# Patient Record
Sex: FEMALE | Race: WHITE | NOT HISPANIC OR LATINO | Employment: UNEMPLOYED | URBAN - METROPOLITAN AREA
[De-identification: names, ages, dates, MRNs, and addresses within clinical notes are randomized per-mention and may not be internally consistent; named-entity substitution may affect disease eponyms.]

---

## 2017-03-27 ENCOUNTER — OFFICE VISIT (OUTPATIENT)
Dept: FAMILY MEDICINE CLINIC | Facility: CLINIC | Age: 37
End: 2017-03-27

## 2017-03-27 ENCOUNTER — APPOINTMENT (OUTPATIENT)
Dept: ULTRASOUND IMAGING | Facility: HOSPITAL | Age: 37
End: 2017-03-27

## 2017-03-27 ENCOUNTER — HOSPITAL ENCOUNTER (EMERGENCY)
Facility: HOSPITAL | Age: 37
Discharge: HOME | End: 2017-03-27
Attending: EMERGENCY MEDICINE | Admitting: EMERGENCY MEDICINE

## 2017-03-27 VITALS
WEIGHT: 116 LBS | SYSTOLIC BLOOD PRESSURE: 108 MMHG | BODY MASS INDEX: 18.21 KG/M2 | OXYGEN SATURATION: 99 % | HEART RATE: 64 BPM | DIASTOLIC BLOOD PRESSURE: 70 MMHG | TEMPERATURE: 98.1 F | HEIGHT: 67 IN

## 2017-03-27 VITALS
OXYGEN SATURATION: 98 % | BODY MASS INDEX: 18.21 KG/M2 | TEMPERATURE: 98.2 F | SYSTOLIC BLOOD PRESSURE: 131 MMHG | HEIGHT: 67 IN | RESPIRATION RATE: 18 BRPM | HEART RATE: 75 BPM | WEIGHT: 116 LBS | DIASTOLIC BLOOD PRESSURE: 75 MMHG

## 2017-03-27 DIAGNOSIS — N30.90 CYSTITIS: ICD-10-CM

## 2017-03-27 DIAGNOSIS — R10.11 ACUTE ABDOMINAL PAIN IN RIGHT UPPER QUADRANT: Primary | ICD-10-CM

## 2017-03-27 DIAGNOSIS — R09.1 PLEURISY: Primary | ICD-10-CM

## 2017-03-27 LAB
ALBUMIN SERPL-MCNC: 4.5 G/DL (ref 3.5–5.2)
ALBUMIN/GLOB SERPL: 1.9 G/DL
ALP SERPL-CCNC: 39 U/L (ref 39–117)
ALT SERPL W P-5'-P-CCNC: 12 U/L (ref 1–33)
ANION GAP SERPL CALCULATED.3IONS-SCNC: 12.5 MMOL/L
AST SERPL-CCNC: 16 U/L (ref 1–32)
BACTERIA UR QL AUTO: ABNORMAL /HPF
BASOPHILS # BLD AUTO: 0.02 10*3/MM3 (ref 0–0.2)
BASOPHILS NFR BLD AUTO: 0.3 % (ref 0–1.5)
BILIRUB SERPL-MCNC: 0.4 MG/DL (ref 0.1–1.2)
BILIRUB UR QL STRIP: NEGATIVE
BUN BLD-MCNC: 9 MG/DL (ref 6–20)
BUN/CREAT SERPL: 16.7 (ref 7–25)
CALCIUM SPEC-SCNC: 9.2 MG/DL (ref 8.6–10.5)
CHLORIDE SERPL-SCNC: 104 MMOL/L (ref 98–107)
CLARITY UR: ABNORMAL
CO2 SERPL-SCNC: 24.5 MMOL/L (ref 22–29)
COLOR UR: YELLOW
CREAT BLD-MCNC: 0.54 MG/DL (ref 0.57–1)
D DIMER PPP FEU-MCNC: <0.27 MCGFEU/ML (ref 0–0.49)
DEPRECATED RDW RBC AUTO: 41.8 FL (ref 37–54)
EOSINOPHIL # BLD AUTO: 0.02 10*3/MM3 (ref 0–0.7)
EOSINOPHIL NFR BLD AUTO: 0.3 % (ref 0.3–6.2)
ERYTHROCYTE [DISTWIDTH] IN BLOOD BY AUTOMATED COUNT: 12.6 % (ref 11.7–13)
GFR SERPL CREATININE-BSD FRML MDRD: 128 ML/MIN/1.73
GLOBULIN UR ELPH-MCNC: 2.4 GM/DL
GLUCOSE BLD-MCNC: 106 MG/DL (ref 65–99)
GLUCOSE UR STRIP-MCNC: NEGATIVE MG/DL
HCG SERPL QL: NEGATIVE
HCT VFR BLD AUTO: 38.9 % (ref 35.6–45.5)
HGB BLD-MCNC: 12.9 G/DL (ref 11.9–15.5)
HGB UR QL STRIP.AUTO: NEGATIVE
HYALINE CASTS UR QL AUTO: ABNORMAL /LPF
IMM GRANULOCYTES # BLD: 0.02 10*3/MM3 (ref 0–0.03)
IMM GRANULOCYTES NFR BLD: 0.3 % (ref 0–0.5)
KETONES UR QL STRIP: NEGATIVE
LEUKOCYTE ESTERASE UR QL STRIP.AUTO: ABNORMAL
LIPASE SERPL-CCNC: 38 U/L (ref 13–60)
LYMPHOCYTES # BLD AUTO: 1.4 10*3/MM3 (ref 0.9–4.8)
LYMPHOCYTES NFR BLD AUTO: 22.1 % (ref 19.6–45.3)
MCH RBC QN AUTO: 30.4 PG (ref 26.9–32)
MCHC RBC AUTO-ENTMCNC: 33.2 G/DL (ref 32.4–36.3)
MCV RBC AUTO: 91.7 FL (ref 80.5–98.2)
MONOCYTES # BLD AUTO: 0.31 10*3/MM3 (ref 0.2–1.2)
MONOCYTES NFR BLD AUTO: 4.9 % (ref 5–12)
NEUTROPHILS # BLD AUTO: 4.57 10*3/MM3 (ref 1.9–8.1)
NEUTROPHILS NFR BLD AUTO: 72.1 % (ref 42.7–76)
NITRITE UR QL STRIP: NEGATIVE
PH UR STRIP.AUTO: 6 [PH] (ref 5–8)
PLATELET # BLD AUTO: 182 10*3/MM3 (ref 140–500)
PMV BLD AUTO: 11.3 FL (ref 6–12)
POTASSIUM BLD-SCNC: 3.5 MMOL/L (ref 3.5–5.2)
PROT SERPL-MCNC: 6.9 G/DL (ref 6–8.5)
PROT UR QL STRIP: NEGATIVE
RBC # BLD AUTO: 4.24 10*6/MM3 (ref 3.9–5.2)
RBC # UR: ABNORMAL /HPF
REF LAB TEST METHOD: ABNORMAL
SODIUM BLD-SCNC: 141 MMOL/L (ref 136–145)
SP GR UR STRIP: 1.01 (ref 1–1.03)
SQUAMOUS #/AREA URNS HPF: ABNORMAL /HPF
UROBILINOGEN UR QL STRIP: ABNORMAL
WBC NRBC COR # BLD: 6.34 10*3/MM3 (ref 4.5–10.7)
WBC UR QL AUTO: ABNORMAL /HPF
WHOLE BLOOD HOLD SPECIMEN: NORMAL

## 2017-03-27 PROCEDURE — 85379 FIBRIN DEGRADATION QUANT: CPT | Performed by: EMERGENCY MEDICINE

## 2017-03-27 PROCEDURE — 36415 COLL VENOUS BLD VENIPUNCTURE: CPT | Performed by: EMERGENCY MEDICINE

## 2017-03-27 PROCEDURE — 83690 ASSAY OF LIPASE: CPT | Performed by: EMERGENCY MEDICINE

## 2017-03-27 PROCEDURE — 99283 EMERGENCY DEPT VISIT LOW MDM: CPT

## 2017-03-27 PROCEDURE — 87086 URINE CULTURE/COLONY COUNT: CPT | Performed by: EMERGENCY MEDICINE

## 2017-03-27 PROCEDURE — 81001 URINALYSIS AUTO W/SCOPE: CPT | Performed by: EMERGENCY MEDICINE

## 2017-03-27 PROCEDURE — 85025 COMPLETE CBC W/AUTO DIFF WBC: CPT | Performed by: EMERGENCY MEDICINE

## 2017-03-27 PROCEDURE — 84703 CHORIONIC GONADOTROPIN ASSAY: CPT | Performed by: EMERGENCY MEDICINE

## 2017-03-27 PROCEDURE — 76705 ECHO EXAM OF ABDOMEN: CPT

## 2017-03-27 PROCEDURE — 80053 COMPREHEN METABOLIC PANEL: CPT | Performed by: EMERGENCY MEDICINE

## 2017-03-27 PROCEDURE — 99214 OFFICE O/P EST MOD 30 MIN: CPT | Performed by: NURSE PRACTITIONER

## 2017-03-27 RX ORDER — SULFAMETHOXAZOLE AND TRIMETHOPRIM 800; 160 MG/1; MG/1
1 TABLET ORAL 2 TIMES DAILY
Qty: 6 TABLET | Refills: 0 | Status: SHIPPED | OUTPATIENT
Start: 2017-03-27

## 2017-03-27 RX ORDER — SODIUM CHLORIDE 0.9 % (FLUSH) 0.9 %
10 SYRINGE (ML) INJECTION AS NEEDED
Status: DISCONTINUED | OUTPATIENT
Start: 2017-03-27 | End: 2017-03-27 | Stop reason: HOSPADM

## 2017-03-27 RX ORDER — NAPROXEN 500 MG/1
500 TABLET ORAL 2 TIMES DAILY WITH MEALS
Qty: 15 TABLET | Refills: 0 | Status: SHIPPED | OUTPATIENT
Start: 2017-03-27

## 2017-03-27 NOTE — PROGRESS NOTES
Subjective   Jazz Tatum is a 36 y.o. female.     History of Present Illness Patient admits to right sided abdominal pain for one week. Pain is constant and sharp. Pain increases with bending. Pain is 5/8-10. No nausea.  Pain started 1 week ago and it has progressed. Pt has applied heat and pain is radiating to back and under right ribs.   Abdominal Pain: Patient complains of abdominal pain. The pain is described as pressure-like, sharp, shooting and stabbing, and is 8/10 in intensity. Pain is located in the RUQ with radiation to back. Onset was 1 week ago. Symptoms have been rapidly worsening since. Aggravating factors: sitting up, standing and movement.  Alleviating factors: none. Associated symptoms: anorexia. The patient denies fever, headache, hematuria and melena.  LMP 2 weeks ago - irregular,  has had a vasectomy. Pt has a hx of fertility problems and early menopausal symptoms.     The following portions of the patient's history were reviewed and updated as appropriate: allergies, current medications, past family history, past medical history, past social history, past surgical history and problem list.    Review of Systems   Constitutional: Negative for fever.   Gastrointestinal: Positive for nausea.       Objective   Physical Exam   Constitutional: She appears well-developed and well-nourished.   HENT:   Head: Normocephalic.   Eyes: Pupils are equal, round, and reactive to light.   Neck: Normal range of motion. Neck supple.   Cardiovascular: Normal rate and regular rhythm.    Pulmonary/Chest: Effort normal and breath sounds normal.   Abdominal: Soft. She exhibits no distension and no mass. There is no hepatosplenomegaly. There is tenderness in the right upper quadrant. There is rebound and guarding. No hernia.       Acute right upper quadrant pain with radiation to back. Skin clear.   Lymphadenopathy:     She has cervical adenopathy.   Neurological: She is alert.   Skin: Skin is warm and  dry.   Psychiatric: She has a normal mood and affect.   Nursing note and vitals reviewed.      Assessment/Plan   Jazz was seen today for abdominal pain.    Diagnoses and all orders for this visit:    Acute abdominal pain in right upper quadrant    Pt to ER via private car with .

## 2017-03-27 NOTE — ED PROVIDER NOTES
EMERGENCY DEPARTMENT ENCOUNTER    CHIEF COMPLAINT  Chief Complaint: Abdominal Pain  History given by: Patient  History limited by: Nothing  Room Number: 11/11  PMD: Lester Almaguer MD      HPI:  Pt is a 36 y.o. female presents complaining of gradually worsening RUQ abdominal pain, which began one week ago and she has progressively developed radiating pain to her back. The patient believed that she might have pulled at muscle although she does not remember injuring the area. She placed heating pads over the area since onset onset with no relief.  Patient states that the abdominal pain is exacerbated with palpation, deep breaths or movement and improved with positional rest. The patient had a recent cold approximately two weeks ago, although she is currently asymptomatic for URI. She denies nausea, vomiting, diarrhea, fever or chills since onset. No other complaints at this time.        Duration:  One week  Onset: Gradual  Timing: Constant  Location: RUQ  Radiation: Back   Quality: Stabbing  Intensity/Severity: Moderate  Progression: Worsening  Associated Symptoms: Abdominal pain, back pain  Aggravating Factors: Palpation, deep breaths movement  Alleviating Factors: Immobilziation  Previous Episodes: None  Treatment before arrival: Heating pad    PAST MEDICAL HISTORY  Active Ambulatory Problems     Diagnosis Date Noted   • No Active Ambulatory Problems     Resolved Ambulatory Problems     Diagnosis Date Noted   • No Resolved Ambulatory Problems     Past Medical History:   Diagnosis Date   • ADHD (attention deficit hyperactivity disorder)    • Asthma        PAST SURGICAL HISTORY  Past Surgical History:   Procedure Laterality Date   • LEG SURGERY Bilateral     oblation       FAMILY HISTORY  Family History   Problem Relation Age of Onset   • Asthma Mother        SOCIAL HISTORY  Social History     Social History   • Marital status:      Spouse name: N/A   • Number of children: N/A   • Years of education: N/A      Occupational History   • Not on file.     Social History Main Topics   • Smoking status: Never Smoker   • Smokeless tobacco: Never Used   • Alcohol use 4.2 oz/week     7 Glasses of wine per week   • Drug use: Not on file   • Sexual activity: Not on file     Other Topics Concern   • Not on file     Social History Narrative       ALLERGIES  Penicillins    REVIEW OF SYSTEMS  Review of Systems   Constitutional: Negative for chills.   HENT: Negative for congestion, rhinorrhea and sore throat.    Eyes: Negative for pain.   Respiratory: Negative for cough and shortness of breath.    Cardiovascular: Negative for chest pain, palpitations and leg swelling.   Gastrointestinal: Positive for abdominal pain (RUQ). Negative for diarrhea, nausea and vomiting.   Genitourinary: Negative for difficulty urinating, dysuria, flank pain and frequency.   Musculoskeletal: Positive for back pain. Negative for myalgias, neck pain and neck stiffness.   Skin: Negative for rash.   Neurological: Negative for dizziness, speech difficulty, weakness, light-headedness, numbness and headaches.   Psychiatric/Behavioral: Negative.    All other systems reviewed and are negative.      PHYSICAL EXAM  ED Triage Vitals   Temp Heart Rate Resp BP SpO2   03/27/17 1153 03/27/17 1153 03/27/17 1153 03/27/17 1153 03/27/17 1153   97.9 °F (36.6 °C) 80 16 131/92 99 %      Temp src Heart Rate Source Patient Position BP Location FiO2 (%)   03/27/17 1153 03/27/17 1153 -- -- --   Tympanic Monitor          Physical Exam   Constitutional: She is oriented to person, place, and time and well-developed, well-nourished, and in no distress. No distress.   HENT:   Head: Normocephalic and atraumatic.   Eyes: EOM are normal. Pupils are equal, round, and reactive to light.   Neck: Normal range of motion. Neck supple.   Cardiovascular: Normal rate, regular rhythm and normal heart sounds.    Pulmonary/Chest: Effort normal and breath sounds normal. No respiratory distress.   Right  chest wall tenderness.    Abdominal: Soft. There is tenderness in the right upper quadrant. There is guarding (Vol.). There is no rebound.   Musculoskeletal: Normal range of motion. She exhibits no edema.        Right lower leg: She exhibits no tenderness.        Left lower leg: She exhibits no tenderness.   Non-reproducible back pain.     Neurological: She is alert and oriented to person, place, and time. She has normal sensation and normal strength.   Normal neuro   Skin: Skin is warm and dry. No rash noted.   No edema lower extremities    Psychiatric: Mood and affect normal.   Nursing note and vitals reviewed.      LAB RESULTS  Lab Results (last 24 hours)     Procedure Component Value Units Date/Time    CBC & Differential [[id number]] Collected:  03/27/17 1201    Specimen:  Blood Updated:  03/27/17 1240    Narrative:       The following orders were created for panel order CBC & Differential.  Procedure                               Abnormality         Status                     ---------                               -----------         ------                     CBC Auto Differential[[id number]]         Abnormal            Final result                 Please view results for these tests on the individual orders.    Comprehensive Metabolic Panel [[id number]]  (Abnormal) Collected:  03/27/17 1201    Specimen:  Blood Updated:  03/27/17 1301     Glucose 106 (H) mg/dL      BUN 9 mg/dL      Creatinine 0.54 (L) mg/dL      Sodium 141 mmol/L      Potassium 3.5 mmol/L      Chloride 104 mmol/L      CO2 24.5 mmol/L      Calcium 9.2 mg/dL      Total Protein 6.9 g/dL      Albumin 4.50 g/dL      ALT (SGPT) 12 U/L      AST (SGOT) 16 U/L      Alkaline Phosphatase 39 U/L      Total Bilirubin 0.4 mg/dL      eGFR Non African Amer 128 mL/min/1.73      Globulin 2.4 gm/dL      A/G Ratio 1.9 g/dL      BUN/Creatinine Ratio 16.7     Anion Gap 12.5 mmol/L     Lipase [[id number]]  (Normal) Collected:  03/27/17 1201    Specimen:  Blood Updated:   03/27/17 1301     Lipase 38 U/L     hCG, Serum, Qualitative [[id number]]  (Normal) Collected:  03/27/17 1201    Specimen:  Blood Updated:  03/27/17 1304     HCG Qualitative Negative    CBC Auto Differential [[id number]]  (Abnormal) Collected:  03/27/17 1201    Specimen:  Blood Updated:  03/27/17 1240     WBC 6.34 10*3/mm3      RBC 4.24 10*6/mm3      Hemoglobin 12.9 g/dL      Hematocrit 38.9 %      MCV 91.7 fL      MCH 30.4 pg      MCHC 33.2 g/dL      RDW 12.6 %      RDW-SD 41.8 fl      MPV 11.3 fL      Platelets 182 10*3/mm3      Neutrophil % 72.1 %      Lymphocyte % 22.1 %      Monocyte % 4.9 (L) %      Eosinophil % 0.3 %      Basophil % 0.3 %      Immature Grans % 0.3 %      Neutrophils, Absolute 4.57 10*3/mm3      Lymphocytes, Absolute 1.40 10*3/mm3      Monocytes, Absolute 0.31 10*3/mm3      Eosinophils, Absolute 0.02 10*3/mm3      Basophils, Absolute 0.02 10*3/mm3      Immature Grans, Absolute 0.02 10*3/mm3     D-dimer, Quantitative [[id number]]  (Normal) Collected:  03/27/17 1201    Specimen:  Blood from Arm, Right Updated:  03/27/17 1747     D-Dimer, Quantitative <0.27 MCGFEU/mL     Narrative:       The [employer]ago D-Dimer test used in conjunction with a clinical pretest probability (PTP) assessment model, has been approved by the FDA to rule out the presence of venous thromboembolism (VTE) in outpatients suspected of deep venous thrombosis (DVT) or pulmonary embolism (PE).     Urinalysis With / Culture If Indicated [[id number]]  (Abnormal) Collected:  03/27/17 1646    Specimen:  Urine from Urine, Clean Catch Updated:  03/27/17 1711     Color, UA Yellow     Appearance, UA Cloudy (A)     pH, UA 6.0     Specific Gravity, UA 1.010     Glucose, UA Negative     Ketones, UA Negative     Bilirubin, UA Negative     Blood, UA Negative     Protein, UA Negative     Leuk Esterase, UA Trace (A)     Nitrite, UA Negative     Urobilinogen, UA 0.2 E.U./dL    Urinalysis, Microscopic Only [[id number]]  (Abnormal) Collected:  03/27/17 7407     Specimen:  Urine from Urine, Clean Catch Updated:  03/27/17 1748     RBC, UA 0-2 /HPF      WBC, UA 0-2 /HPF      Bacteria, UA 2+ (A) /HPF      Squamous Epithelial Cells, UA 13-20 (A) /HPF      Hyaline Casts, UA 3-6 /LPF      Methodology Automated Microscopy    Urine Culture [[id number]] Collected:  03/27/17 1646    Specimen:  Urine from Urine, Clean Catch Updated:  03/27/17 1720          I ordered the above labs and reviewed the results    RADIOLOGY  US Gallbladder   Final Result       No evidence for acute cholecystitis. With persistent clinical   indication, hepatobiliary scintigraphy and/or CT scan could be   considered for further evaluation.               This report was finalized on 3/27/2017 5:16 PM by Dr. Donell Dominguez MD.             17:40  Reviewed US Gallbladder - Normal. Independently viewed by me. Interpreted by radiologist.    I ordered the above noted radiological studies. Interpreted by radiologist. Reviewed by me in PACS.       PROCEDURES  Procedures      PROGRESS AND CONSULTS  ED Course     11:56  Ordered Labs for further evaluation     16:35  Ordered US Gallbladder for further evaluation     18:20  Rechecked patient and they are resting comfortably. Discussed pertinent lab and imaging results, including US gallbladder shows nothing acute. Her UA showed mild signs of infection. I also explained that her D-Dimer was negative. I explained that she most likely has pleurisy and discussed the plan to discharge the patient home on Naprosyn and Bactrim.  Patient agrees with plan and all questions were addressed.     Latest vital signs   BP- 118/70 HR- 78 Temp- 98.2 °F (36.8 °C) (Oral) O2 sat- 96%        MEDICAL DECISION MAKING  Results were reviewed/discussed with the patient and they were also made aware of online access. Pt also made aware that some labs, such as cultures, will not be resulted during ER visit and follow up with PMD is necessary.     MDM  Number of Diagnoses or Management Options      Amount and/or Complexity of Data Reviewed  Clinical lab tests: ordered and reviewed  Tests in the radiology section of CPT®: ordered and reviewed    Patient Progress  Patient progress: stable         DIAGNOSIS  Final diagnoses:   Pleurisy   Cystitis       DISPOSITION  DISCHARGE    Patient discharged in stable condition.    Reviewed implications of results, diagnosis, meds, responsibility to follow up, warning signs and symptoms of possible worsening, potential complications and reasons to return to ER, including worsening pleuritic symptoms.     Patient/Family voiced understanding of above instructions.    Discussed plan for discharge, as there is no emergent indication for admission.  Pt/family is agreeable and understands need for follow up and repeat testing.  Pt is aware that discharge does not mean that nothing is wrong but it indicates no emergency is present that requires admission and they must continue care with follow-up as given below or physician of their choice.     FOLLOW-UP  Lester Almaguer MD  [address] [id number] [phone]    In 3 days  If symptoms worsen         Medication List      New Prescriptions          naproxen 500 MG EC tablet   Commonly known as:  EC NAPROSYN   Take 1 tablet by mouth 2 (Two) Times a Day With Meals.       sulfamethoxazole-trimethoprim 800-160 MG per tablet   Commonly known as:  BACTRIM DS,SEPTRA DS   Take 1 tablet by mouth 2 (Two) Times a Day.             Latest Documented Vital Signs:  As of 6:36 PM  BP- 118/70 HR- 78 Temp- 98.2 °F (36.8 °C) (Oral) O2 sat- 96%    --  Documentation assistance provided by stacey Joseph for .  Information recorded by the scribe was done at my direction and has been verified and validated by me.            Johnny Joseph  03/27/17 1836       Immanuel Arreguin MD  03/27/17 2002

## 2017-03-28 ENCOUNTER — TELEPHONE (OUTPATIENT)
Dept: SOCIAL WORK | Facility: HOSPITAL | Age: 37
End: 2017-03-28

## 2017-03-28 NOTE — TELEPHONE ENCOUNTER
ED follow-up phone call. States she is taking prescribed meds and is feeling a little better, but til has soreness. No questions/concerns

## 2017-03-29 LAB — BACTERIA SPEC AEROBE CULT: NORMAL

## 2021-04-06 ENCOUNTER — BULK ORDERING (OUTPATIENT)
Dept: CASE MANAGEMENT | Facility: OTHER | Age: 41
End: 2021-04-06

## 2021-04-06 DIAGNOSIS — Z23 IMMUNIZATION DUE: ICD-10-CM

## 2021-06-24 ENCOUNTER — APPOINTMENT (OUTPATIENT)
Dept: WOMENS IMAGING | Facility: HOSPITAL | Age: 41
End: 2021-06-24

## 2021-06-24 PROCEDURE — 77063 BREAST TOMOSYNTHESIS BI: CPT | Performed by: RADIOLOGY

## 2021-06-24 PROCEDURE — 77067 SCR MAMMO BI INCL CAD: CPT | Performed by: RADIOLOGY

## 2022-07-06 ENCOUNTER — APPOINTMENT (OUTPATIENT)
Dept: WOMENS IMAGING | Facility: HOSPITAL | Age: 42
End: 2022-07-06

## 2022-07-06 PROCEDURE — 77063 BREAST TOMOSYNTHESIS BI: CPT | Performed by: RADIOLOGY

## 2022-07-06 PROCEDURE — 77067 SCR MAMMO BI INCL CAD: CPT | Performed by: RADIOLOGY

## 2024-05-23 PROBLEM — Z04.9 CONDITION NOT FOUND: Status: ACTIVE | Noted: 2024-05-23

## 2024-05-23 PROBLEM — N94.89 PELVIC CONGESTION SYNDROME: Status: ACTIVE | Noted: 2024-05-23

## 2024-06-18 ENCOUNTER — OFFICE VISIT (OUTPATIENT)
Age: 44
End: 2024-06-18
Payer: COMMERCIAL

## 2024-06-18 VITALS
WEIGHT: 116 LBS | DIASTOLIC BLOOD PRESSURE: 80 MMHG | SYSTOLIC BLOOD PRESSURE: 122 MMHG | BODY MASS INDEX: 18.21 KG/M2 | HEIGHT: 67 IN

## 2024-06-18 DIAGNOSIS — I78.1 SPIDER VEINS: Primary | ICD-10-CM

## 2024-06-18 PROCEDURE — 99203 OFFICE O/P NEW LOW 30 MIN: CPT | Performed by: NURSE PRACTITIONER

## 2024-06-18 NOTE — PROGRESS NOTES
"Chief Complaint  New Patient (Varicose/spider veins. )    Subjective      History of Present Illness  Jazz Tatum presents to [facility] as a new patient with complaints of spider telangiectasia veins to her left leg.    Past History:  Medical History: has a past medical history of ADHD (attention deficit hyperactivity disorder), Asthma, History of mammogram (10/2014), Phlebitis and thrombophlebitis of superficial vessels of unspecified lower extremity, Spider veins (11/13/2018), Varicose veins of bilateral lower extremities with pain (11/13/2018), and Venous insufficiency (chronic) (peripheral) (11/13/2018).   Surgical History: has a past surgical history that includes Leg Surgery (Bilateral); Colonoscopy (2010); Endovenous ablation saphenous vein w/ laser (Left, 01/04/2013); and Endovenous ablation saphenous vein w/ laser (Right, 01/18/2013).   Family History: family history includes Asthma in her mother; Cancer in her father; Clotting disorder in her father; Heart disease in her father; Stroke in her father.   Social History: reports that she has never smoked. She has never used smokeless tobacco. She reports current alcohol use of about 7.0 standard drinks of alcohol per week.    (Not in a hospital admission)     Allergies: Penicillins and Sulfamethoxazole-trimethoprim     Jazz Tatum  reports that she has never smoked. She has never used smokeless tobacco..       Objective   Vital Signs:  /80   Ht 170.2 cm (67.01\")   Wt 52.6 kg (116 lb)   BMI 18.16 kg/m²   Estimated body mass index is 18.16 kg/m² as calculated from the following:    Height as of this encounter: 170.2 cm (67.01\").    Weight as of this encounter: 52.6 kg (116 lb).           Physical Exam  Vitals reviewed.   Constitutional:       Appearance: Normal appearance. She is normal weight.   Cardiovascular:      Rate and Rhythm: Normal rate and regular rhythm.   Pulmonary:      Breath sounds: " Normal breath sounds.   Musculoskeletal:         General: Normal range of motion.   Neurological:      General: No focal deficit present.      Mental Status: She is alert.        Venous Right leg:Reticular Vein(s)  CEAP Classification right le    Venous Left leg:Telangiectasia  Reticular Vein(s)  CEAP Classification left le    Result Review :                     Assessment and Plan     Diagnoses and all orders for this visit:    1. Spider veins (Primary)    Jazz Tatum is a 44 y.o. female who has been seen in our office by Dr. Marley for venous insufficiency with symptomatic varicose veins.  She has undergone bilateral greater saphenous vein EVLAs, as well as several treatments of injection sclerotherapy.  She did have a pelvic scan in 2018 which identified pelvic congestion syndrome.  Dr. Marley discussed possible treatment options with coil embolism as well as ligation.  She opted against this and proceeded with injection sclerotherapy in this area.  She had very nice results and got significant relief after this treatment.  She returns today with complaints of telangiectasia veins to the left lateral and posterior knee which she would like treated with injection sclerotherapy.  She does not get any pain or swelling to her legs or pelvic region any longer.  She does have a large reticular vein to her left anterior thigh that she reports is more prominent after prolonged standing, especially in the heat.  However it does not cause her any pain and she is not terribly interested in having this treated at this time.  She did begin going through early menopause about 3 years ago and no longer has a menstrual cycle which she believes has also helped with her previous pelvic symptoms.    We have again discussed the progressive nature of venous insufficiency.  She does report she is faithful about wearing compression stockings on a regular basis.  We have discussed treatment options with injection  sclerotherapy versus transdermal laser.  She would like to proceed with injection sclerotherapy and is interested in having Dr. Marley perform this because she has seen in the past.We have discussed what to expect.  The potential risk of bleeding, infection, DVT, STP, stroke, PE, hyperpigmentation, paresthesia, neuropathy, allergy anaphylaxis, ulceration and matting were discussed.  The sun, travel, and lifting restrictions were reviewed.  She will schedule this at her convenience.            Follow Up     Return for elías Enriquez.  Patient was given instructions and counseling regarding her condition or for health maintenance advice. Please see specific information pulled into the AVS if appropriate.

## 2024-11-19 ENCOUNTER — PROCEDURE VISIT (OUTPATIENT)
Age: 44
End: 2024-11-19
Payer: COMMERCIAL

## 2024-11-19 VITALS
SYSTOLIC BLOOD PRESSURE: 112 MMHG | DIASTOLIC BLOOD PRESSURE: 80 MMHG | BODY MASS INDEX: 18.21 KG/M2 | HEIGHT: 67 IN | WEIGHT: 116 LBS

## 2024-11-19 DIAGNOSIS — I78.1 SPIDER VEINS: Primary | ICD-10-CM

## 2024-11-19 DIAGNOSIS — I83.813 VARICOSE VEINS OF LEG WITH PAIN, BILATERAL: ICD-10-CM

## 2024-11-19 DIAGNOSIS — I87.2 VENOUS (PERIPHERAL) INSUFFICIENCY: ICD-10-CM

## 2024-11-19 PROCEDURE — COS224 VENOUS SCLEROTHERAPY: Performed by: SURGERY

## 2024-11-19 NOTE — PROGRESS NOTES
Venous Sclerotherapy    Date/Time: 11/19/2024 3:33 PM    Performed by: Kelsie Marley Jr., MD  Authorized by: Kelsie Marley Jr., MD    Procedure:  Right single/ multiple injections of sclerosant, spider veins; limb and Left single/ multiple injections of sclerosant, spider veins; limb  Indications: spider veins and varicose veins    Symptoms: symptomatic    The procedure was performed for cosmetic reasons.       Consent obtained:  Verbal  Consent given by:  Patient  Risks, benefits, and alternatives were discussed: yes    Risks discussed:  Ulceration, skin staining, failure to clear vein, poor cosmetic result and matting  Alternatives discussed:  No treatment    Anesthesia method:  None      Sclerosant: polidocanol    Sclerosant site:  Bilateral leg  Polidocanol concentration:  0.5%  Sclerosant amount (ml):  8  Treated vein: reticular vein    Blood loss (ml):  0  Specimen:  None    Procedure Description:   The skin was prepped with alcohol. A 32 gauge needle was placed in the varix on negative tension and then confirmed in position. The sclerosing solution was injected with confirmation of intravenous position with a low pressure injection. There was appropriate blanching of the varices. Cotton balls and paper tape were placed over the injection sites.       Procedure completion:  Tolerated well, no complications  Dressing: compression stockings applied and cotton balls and paper tape applied

## 2024-12-05 NOTE — PROGRESS NOTES
"Chief Complaint  Follow-up (Injection follow up 4-6 weeks)  Follow-up injection sclerotherapy    Subjective        Jazz Tatum presents to [facility]  History of Present Illness patient is a 44-year-old female with longstanding venous insufficiency and painful varicose veins.  In addition, has documented pelvic congestion syndrome.  She has had previous ablations of the greater saphenous veins as well as multiple sessions of  injection sclerotherapy.  She returned on November 19, 2024 for further injections.  She states her legs already feel better and are visually better.    Past History:  Medical History: has a past medical history of ADHD (attention deficit hyperactivity disorder), Asthma, History of mammogram (10/2014), Phlebitis and thrombophlebitis of superficial vessels of unspecified lower extremity, Spider veins (11/13/2018), Varicose veins of bilateral lower extremities with pain (11/13/2018), and Venous insufficiency (chronic) (peripheral) (11/13/2018).   Surgical History: has a past surgical history that includes Leg Surgery (Bilateral); Colonoscopy (2010); Endovenous ablation saphenous vein w/ laser (Left, 01/04/2013); and Endovenous ablation saphenous vein w/ laser (Right, 01/18/2013).   Family History: family history includes Asthma in her mother; Cancer in her father; Clotting disorder in her father; Heart disease in her father; Stroke in her father.   Social History: reports that she has never smoked. She has never used smokeless tobacco. She reports current alcohol use of about 7.0 standard drinks of alcohol per week.    (Not in a hospital admission)     Allergies: Penicillins and Sulfamethoxazole-trimethoprim   Objective   Vital Signs:  /76   Pulse 65   Ht 170.2 cm (67.01\")   Wt 51.7 kg (114 lb)   SpO2 99%   BMI 17.85 kg/m²   Estimated body mass index is 17.85 kg/m² as calculated from the following:    Height as of this encounter: 170.2 cm " "(67.01\").    Weight as of this encounter: 51.7 kg (114 lb).     BMI is below normal parameters (malnutrition). Recommendations: Nothing needed    Jazz Tatum  reports that she has never smoked. She has never used smokeless tobacco.          Physical Exam thrombosed reticular varices in the left anterior thigh and medial knee.  Few thrombosed spider telangiectasia bilaterally in the medial knee used.  Overall improved.  Result Review :                     Assessment and Plan     Diagnoses and all orders for this visit:    1. Varicose veins of bilateral lower extremities with pain (Primary)    2. Pelvic congestion syndrome    3. Spider veins of both lower extremities    4. Venous (peripheral) insufficiency    Code , no charge post injection sclerotherapy  She has had a nice result and she is very pleased with her results.  At this point she can return to normal exercise.  She will continue to wear compression stockings during the daytime hours when she is up on her feet.  I will see her in follow-up in 3 months and she knows that we will take a few more months for the thrombosed veins to fade resolved.       Follow Up     Return in about 3 months (around 3/17/2025) for For injection check.  Patient was given instructions and counseling regarding her condition or for health maintenance advice. Please see specific information pulled into the AVS if appropriate.       "

## 2024-12-17 ENCOUNTER — OFFICE VISIT (OUTPATIENT)
Age: 44
End: 2024-12-17
Payer: COMMERCIAL

## 2024-12-17 VITALS
SYSTOLIC BLOOD PRESSURE: 120 MMHG | HEART RATE: 65 BPM | BODY MASS INDEX: 17.89 KG/M2 | WEIGHT: 114 LBS | OXYGEN SATURATION: 99 % | HEIGHT: 67 IN | DIASTOLIC BLOOD PRESSURE: 76 MMHG

## 2024-12-17 DIAGNOSIS — I87.2 VENOUS (PERIPHERAL) INSUFFICIENCY: ICD-10-CM

## 2024-12-17 DIAGNOSIS — N94.89 PELVIC CONGESTION SYNDROME: ICD-10-CM

## 2024-12-17 DIAGNOSIS — I83.813 VARICOSE VEINS OF BILATERAL LOWER EXTREMITIES WITH PAIN: Primary | ICD-10-CM

## 2024-12-17 DIAGNOSIS — I83.93 SPIDER VEINS OF BOTH LOWER EXTREMITIES: ICD-10-CM

## 2024-12-17 PROCEDURE — 99024 POSTOP FOLLOW-UP VISIT: CPT | Performed by: SURGERY

## 2025-03-18 NOTE — PROGRESS NOTES
Chief Complaint  No chief complaint on file.  Follow-up on venous insufficiency and injection sclerotherapy.    Subjective        Jazz Tatum presents to [facility] VASCULAR SURGERY  History of Present Illness  The patient is a 44-year-old female with longstanding venous insufficiency and painful varicose veins. In addition, she has documented pelvic congestion syndrome. She has had previous ablations of the greater saphenous veins as well as multiple sessions of injection sclerotherapy. She returned on November 19, 2024 for further injections. She states her legs already feel better and are visually better.  She returned on March 25, 2025 in follow-up.  2 areas on the left leg at the knee that are bothersome to her.    Past History:  Medical History: has a past medical history of ADHD (attention deficit hyperactivity disorder), Asthma, History of mammogram (10/2014), Phlebitis and thrombophlebitis of superficial vessels of unspecified lower extremity, Spider veins (11/13/2018), Varicose veins of bilateral lower extremities with pain (11/13/2018), and Venous insufficiency (chronic) (peripheral) (11/13/2018).   Surgical History: has a past surgical history that includes Leg Surgery (Bilateral); Colonoscopy (2010); Endovenous ablation saphenous vein w/ laser (Left, 01/04/2013); and Endovenous ablation saphenous vein w/ laser (Right, 01/18/2013).   Family History: family history includes Asthma in her mother; Cancer in her father; Clotting disorder in her father; Heart disease in her father; Stroke in her father.   Social History: reports that she has never smoked. She has never used smokeless tobacco. She reports current alcohol use of about 7.0 standard drinks of alcohol per week.    (Not in a hospital admission)     Allergies: Penicillins and Sulfamethoxazole-trimethoprim   Objective   Vital Signs:  There were no vitals taken for this visit.  Estimated body mass index is 17.85 kg/m² as  "calculated from the following:    Height as of 12/17/24: 170.2 cm (67.01\").    Weight as of 12/17/24: 51.7 kg (114 lb).     BMI is below normal parameters (malnutrition). Recommendations: referral to primary care    Jazz Tatum  reports that she has never smoked. She has never used smokeless tobacco.       Physical Exam she has had a very good result but there are 2 clusters of spider telangiectasia at the knee laterally on the left.  They are feeding reticular veins to this.  Result Review :                     Assessment and Plan     Diagnoses and all orders for this visit:    1. Varicose veins of bilateral lower extremities with pain (Primary)    2. Pelvic congestion syndrome    3. Spider veins of both lower extremities    4. Venous (peripheral) insufficiency    Overall she has had a nice result but still has a few areas that are concerning to her.  She has burning related to these particular the longer she is up on her feet.  I have recommended repeat injection sclerotherapy for these areas.  Post procedure expectations were discussed with her once again.  She is in agreement.  Will set this up for the near future.  Code , no charge post injection sclerotherapy       Follow Up     No follow-ups on file.  Patient was given instructions and counseling regarding her condition or for health maintenance advice. Please see specific information pulled into the AVS if appropriate.       "

## 2025-03-25 ENCOUNTER — OFFICE VISIT (OUTPATIENT)
Age: 45
End: 2025-03-25
Payer: COMMERCIAL

## 2025-03-25 VITALS
SYSTOLIC BLOOD PRESSURE: 133 MMHG | WEIGHT: 114 LBS | HEIGHT: 67 IN | BODY MASS INDEX: 17.89 KG/M2 | DIASTOLIC BLOOD PRESSURE: 78 MMHG

## 2025-03-25 DIAGNOSIS — N94.89 PELVIC CONGESTION SYNDROME: ICD-10-CM

## 2025-03-25 DIAGNOSIS — I83.93 SPIDER VEINS OF BOTH LOWER EXTREMITIES: ICD-10-CM

## 2025-03-25 DIAGNOSIS — I87.2 VENOUS (PERIPHERAL) INSUFFICIENCY: ICD-10-CM

## 2025-03-25 DIAGNOSIS — I83.813 VARICOSE VEINS OF BILATERAL LOWER EXTREMITIES WITH PAIN: Primary | ICD-10-CM

## 2025-03-25 PROCEDURE — 99024 POSTOP FOLLOW-UP VISIT: CPT | Performed by: SURGERY

## 2025-07-29 ENCOUNTER — PROCEDURE VISIT (OUTPATIENT)
Age: 45
End: 2025-07-29
Payer: COMMERCIAL

## 2025-07-29 VITALS
WEIGHT: 115 LBS | RESPIRATION RATE: 17 BRPM | DIASTOLIC BLOOD PRESSURE: 78 MMHG | BODY MASS INDEX: 18.05 KG/M2 | SYSTOLIC BLOOD PRESSURE: 142 MMHG | HEIGHT: 67 IN | HEART RATE: 88 BPM

## 2025-07-29 DIAGNOSIS — I83.93 SPIDER VEINS OF BOTH LOWER EXTREMITIES: Primary | ICD-10-CM

## 2025-07-29 DIAGNOSIS — I87.2 VENOUS (PERIPHERAL) INSUFFICIENCY: ICD-10-CM

## 2025-07-29 PROCEDURE — COS224 VENOUS SCLEROTHERAPY: Performed by: SURGERY

## 2025-07-29 PROCEDURE — 99024 POSTOP FOLLOW-UP VISIT: CPT | Performed by: SURGERY

## 2025-07-29 NOTE — PROGRESS NOTES
Venous Sclerotherapy    Date/Time: 7/29/2025 3:02 PM    Performed by: Kelsie Marley Jr., MD  Authorized by: Kelsie Marley Jr., MD    Procedure:  Right single/ multiple injections of sclerosant, spider veins; limb and Left single/ multiple injections of sclerosant, spider veins; limb  Indications: spider veins    Symptoms: symptomatic    The procedure was performed for cosmetic reasons.       Consent obtained:  Verbal  Consent given by:  Patient  Risks, benefits, and alternatives were discussed: yes    Risks discussed:  Failure to clear vein, poor cosmetic result, skin staining, ulceration and matting  Alternatives discussed:  No treatment    Anesthesia method:  None      Sclerosant: polidocanol    Polidocanol concentration:  0.5%  Sclerosant amount (ml):  6  Treated vein: reticular vein    Blood loss (ml):  0  Specimen:  None    Procedure Description:   The skin was prepped with alcohol. A 32 gauge needle was placed in the varix on negative tension and then confirmed in position. The sclerosing solution was injected with confirmation of intravenous position with a low pressure injection. There was appropriate blanching of the varices. Cotton balls and paper tape were placed over the injection sites.       Procedure completion:  Tolerated well, no complications  Dressing: compression stockings applied and cotton balls and paper tape applied    Post treatment instructions given to the patient: Yes